# Patient Record
Sex: MALE | Race: WHITE | Employment: FULL TIME | URBAN - METROPOLITAN AREA
[De-identification: names, ages, dates, MRNs, and addresses within clinical notes are randomized per-mention and may not be internally consistent; named-entity substitution may affect disease eponyms.]

---

## 2021-01-07 ENCOUNTER — HOSPITAL ENCOUNTER (EMERGENCY)
Age: 21
Discharge: HOME | End: 2021-01-07

## 2021-01-07 ENCOUNTER — APPOINTMENT (OUTPATIENT)
Dept: GENERAL RADIOLOGY | Age: 21
End: 2021-01-07

## 2021-01-07 VITALS
SYSTOLIC BLOOD PRESSURE: 144 MMHG | HEIGHT: 72 IN | DIASTOLIC BLOOD PRESSURE: 69 MMHG | OXYGEN SATURATION: 97 % | TEMPERATURE: 98.2 F | HEART RATE: 64 BPM | RESPIRATION RATE: 19 BRPM | WEIGHT: 150 LBS | BODY MASS INDEX: 20.32 KG/M2

## 2021-01-07 DIAGNOSIS — M25.511 ACUTE PAIN OF RIGHT SHOULDER: Primary | ICD-10-CM

## 2021-01-07 PROCEDURE — 73030 X-RAY EXAM OF SHOULDER: CPT

## 2021-01-07 PROCEDURE — 99283 EMERGENCY DEPT VISIT LOW MDM: CPT

## 2021-01-07 ASSESSMENT — PAIN DESCRIPTION - ORIENTATION: ORIENTATION: RIGHT

## 2021-01-07 ASSESSMENT — PAIN SCALES - GENERAL: PAINLEVEL_OUTOF10: 7

## 2021-01-07 NOTE — ED NOTES
Patient present to the ED with Right shoulder injury due to lifting furnature     Ivon Duong  01/07/21 1845

## 2021-01-08 NOTE — ED PROVIDER NOTES
EMERGENCY DEPARTMENT ENCOUNTER      PCP: Jovanna Lopez MD    CHIEF COMPLAINT    Chief Complaint   Patient presents with    Shoulder Injury     right       This patient was not evaluated by the attending physician. I have independently evaluated this patient. My supervising physician was available for consultation. HPI    Rosa Garcia is a 23 y.o. male who presents with right shoulder pain. Context is this morning around 5:30 AM he was pulling heavy furniture and felt 2 pops in his right shoulder and since then has been having intermittent pain with certain motions of his right shoulder. The quality of the pain is sharp when it occurs. He denies pain at rest when he is not moving his arm. The pain does not radiate. The pain worsens with specific movements and palpation. Pt has tried nothing for relief of pain. Patient denies history of injury to right shoulder in the past.    Patient denies fever, chills, distal numbness, tingling, weakness, or functional deficit. Patient denies other injuries. Patient denies chest pain, shortness of breath. REVIEW OF SYSTEMS    General: Denies fever or chills  Cardiac: Denies chest pain  Pulmonary: Denies shortness of breath, wheezes, or difficulty breathing  GI: Denies abdominal pain, vomiting, or diarrhea  : No dysuria or hematuria  Musculoskeletal: See HPI; Denies any other musculoskeletal injuries, including chest wall and back. All other review of systems are negative  See HPI and nursing notes for additional information     [address]    History reviewed. No pertinent past medical history. History reviewed. No pertinent surgical history.     CURRENT MEDICATIONS        ALLERGIES    No Known Allergies    SOCIAL & FAMILY HISTORY    Social History     Socioeconomic History    Marital status: Single     Spouse name: None    Number of children: None    Years of education: None    Highest education level: None   Occupational History    None   Social Needs    Financial resource strain: None    Food insecurity     Worry: None     Inability: None    Transportation needs     Medical: None     Non-medical: None   Tobacco Use    Smoking status: Current Every Day Smoker   Substance and Sexual Activity    Alcohol use: Yes     Comment: occ    Drug use: Yes     Types: Marijuana    Sexual activity: None   Lifestyle    Physical activity     Days per week: None     Minutes per session: None    Stress: None   Relationships    Social connections     Talks on phone: None     Gets together: None     Attends Hindu service: None     Active member of club or organization: None     Attends meetings of clubs or organizations: None     Relationship status: None    Intimate partner violence     Fear of current or ex partner: None     Emotionally abused: None     Physically abused: None     Forced sexual activity: None   Other Topics Concern    None   Social History Narrative    None     History reviewed. No pertinent family history. PHYSICAL EXAM    VITAL SIGNS: BP (!) 144/69   Pulse 64   Temp 98.2 °F (36.8 °C) (Oral)   Resp 19   Ht 6' (1.829 m)   Wt 150 lb (68 kg)   SpO2 97%   BMI 20.34 kg/m²   Constitutional:  Well developed, Appears comfortable    Musculoskeletal:    Neck:  No gross swelling or discoloration on inspection. No tenderness to palpation or palpable defect. Full range of motion without pain. Right Shoulder Exam:   -Inspection:   No obvious defects, deformities, or discoloration. Skin intact   - Palpation: No swelling     No redness, or warmth     + tenderness to palpation of right AC joint region. No tenderness to palpation of scapula, trapezius, clavicle, humerus. -ROM:   Full active ROM   -Provocative tests: Positive crossarm, positive Brar, negative Swoope, negative drop Arm    No swelling, discoloration, tenderness to palpation, or range of motion deficit of the ipsilateral elbow.     Strength 5/5 and extremity is distally neurovascularly intact. Vascular: Radial pulse 2+ in right upper extremity  Integument:  Well hydrated, no rash   Neurologic:  Alert and oriented. Distal sensation intact. No functional deficits of elbows, wrists, hands, or fingers.  strength out of 5 in right upper extremity. Psychiatric: Cooperative, pleasant affect        RADIOLOGY/PROCEDURES    XR SHOULDER RIGHT (MIN 2 VIEWS)   Preliminary Result   No acute osseous abnormality of the right shoulder. ED COURSE & MEDICAL DECISION MAKING       Vital signs and nursing notes reviewed during ED course. I have independently evaluated this patient. Supervising physician present in the Emergency Department, available for consultation, throughout entirety of patient care. History and exam consistent with musculoskeletal pain of shoulder. Patient and I discussed the possibilty of internal derangement today. I recommend rest and ice. Recommend NSAIDs and patient declines prescription but rather will take NSAIDs over-the-counter. While in the ED, right shoulder x-ray obtained reveals no acute osseous abnormality of the right shoulder per radiologist report. Affected extremity is distally neurovascularly intact. I have low clinical suspicion for septic joint, nerve injury, vascular injury, ACS. Patient is comfortable with discharge at this time. Patient is nontoxic appearing. Vital signs stable. Patient is stable for outpatient management. All pertinent Lab data and radiographic results reviewed with patient at bedside. The patient and/or the family were informed of the results of any tests/labs/imaging, the treatment plan, and time was allotted to answer questions. Diagnosis, disposition, and plan discussed in detail with patient and/or the family today who understands and agrees.  Patient understands and agrees to follow up with PCP as needed for recheck, and with orthopedic physician in the next 5-7 days if no improvement. Patient understands and agrees to return to the emergency department for any new or worsening symptoms, including but not limited to numbness, weakness, tingling, fever, erythema, swelling, warmth of joints, redness of joints. Clinical  IMPRESSION    1. Acute pain of right shoulder           Disposition referral (if applicable):  MD [id number]  [phone]    Call   As needed    aGye Unger MD  [address]  [phone]    Call   Recheck in 5-7 days, As needed    [facility] Emergency Department  [phone]  Go to   Return to ED if symptoms worsen or new symptoms      Disposition medications (if applicable): There are no discharge medications for this patient. Comment: Please note this report has been produced using speech recognition software and may contain errors related to that system including errors in grammar, punctuation, and spelling, as well as words and phrases that may be inappropriate. If there are any questions or concerns please feel free to contact the dictating provider for clarification.           Hilbert Dakins, PA-C  01/07/21 4203

## 2022-04-07 ENCOUNTER — APPOINTMENT (OUTPATIENT)
Dept: GENERAL RADIOLOGY | Age: 22
End: 2022-04-07
Payer: COMMERCIAL

## 2022-04-07 ENCOUNTER — HOSPITAL ENCOUNTER (EMERGENCY)
Age: 22
Discharge: HOME | End: 2022-04-07
Attending: EMERGENCY MEDICINE
Payer: COMMERCIAL

## 2022-04-07 VITALS
RESPIRATION RATE: 12 BRPM | SYSTOLIC BLOOD PRESSURE: 133 MMHG | BODY MASS INDEX: 32.76 KG/M2 | TEMPERATURE: 97.3 F | OXYGEN SATURATION: 99 % | WEIGHT: 234 LBS | HEIGHT: 71 IN | HEART RATE: 69 BPM | DIASTOLIC BLOOD PRESSURE: 60 MMHG

## 2022-04-07 DIAGNOSIS — M75.21 BICEPS TENDINITIS OF RIGHT UPPER EXTREMITY: Primary | ICD-10-CM

## 2022-04-07 PROCEDURE — 73060 X-RAY EXAM OF HUMERUS: CPT

## 2022-04-07 PROCEDURE — 99283 EMERGENCY DEPT VISIT LOW MDM: CPT

## 2022-04-07 RX ORDER — TIZANIDINE 4 MG/1
4 TABLET ORAL EVERY 8 HOURS PRN
Qty: 9 TABLET | Refills: 0 | Status: SHIPPED | OUTPATIENT
Start: 2022-04-07 | End: 2022-04-10

## 2022-04-07 RX ORDER — NAPROXEN 500 MG/1
500 TABLET ORAL 2 TIMES DAILY PRN
Qty: 20 TABLET | Refills: 0 | Status: SHIPPED | OUTPATIENT
Start: 2022-04-07 | End: 2022-04-17

## 2022-04-07 ASSESSMENT — PAIN DESCRIPTION - FREQUENCY: FREQUENCY: CONTINUOUS

## 2022-04-07 ASSESSMENT — ENCOUNTER SYMPTOMS
RESPIRATORY NEGATIVE: 1
EYES NEGATIVE: 1
GASTROINTESTINAL NEGATIVE: 1
BACK PAIN: 0

## 2022-04-07 ASSESSMENT — PAIN - FUNCTIONAL ASSESSMENT: PAIN_FUNCTIONAL_ASSESSMENT: 0-10

## 2022-04-07 ASSESSMENT — PAIN DESCRIPTION - DESCRIPTORS: DESCRIPTORS: ACHING;THROBBING

## 2022-04-07 ASSESSMENT — PAIN DESCRIPTION - PAIN TYPE: TYPE: ACUTE PAIN

## 2022-04-07 ASSESSMENT — PAIN SCALES - GENERAL: PAINLEVEL_OUTOF10: 5

## 2022-04-07 ASSESSMENT — PAIN DESCRIPTION - ORIENTATION: ORIENTATION: RIGHT

## 2022-04-07 ASSESSMENT — PAIN DESCRIPTION - LOCATION: LOCATION: ARM

## 2022-04-07 NOTE — ED PROVIDER NOTES
The history is provided by the patient. Arm Injury  Location:  Arm  Arm location:  R arm  Pain details:     Quality:  Aching and dull    Severity:  Moderate    Onset quality:  Gradual    Timing:  Constant    Progression:  Worsening  Handedness:  Right-handed  Dislocation: no    Tetanus status:  Up to date  Prior injury to area:  No  Relieved by:  Rest  Worsened by: Movement, stretching area and exercise  Ineffective treatments:  None tried  Associated symptoms: decreased range of motion and stiffness    Associated symptoms: no back pain, no fatigue, no fever, no muscle weakness, no neck pain, no numbness, no swelling and no tingling    Associated symptoms comment:   for the past 3 days has been having pain in his right arm from elbow to shoulder.  lifts air tanks at work.  has taken nothing for the pain. Review of Systems   Constitutional: Negative. Negative for fatigue and fever. HENT: Negative. Eyes: Negative. Respiratory: Negative. Cardiovascular: Negative. Gastrointestinal: Negative. Genitourinary: Negative. Musculoskeletal: Positive for stiffness. Negative for back pain and neck pain. Skin: Negative. Neurological: Negative. All other systems reviewed and are negative. History reviewed. No pertinent family history.   Social History     Socioeconomic History    Marital status: Single     Spouse name: Not on file    Number of children: Not on file    Years of education: Not on file    Highest education level: Not on file   Occupational History    Not on file   Tobacco Use    Smoking status: Current Every Day Smoker     Packs/day: 0.20     Types: Cigarettes    Smokeless tobacco: Never Used   Vaping Use    Vaping Use: Every day    Substances: Nicotine   Substance and Sexual Activity    Alcohol use: Yes     Comment: occ    Drug use: Not Currently     Types: Marijuana Lum Olden)    Sexual activity: Not on file   Other Topics Concern    Not on file Social History Narrative    Not on file     Social Determinants of Health     Financial Resource Strain:     Difficulty of Paying Living Expenses: Not on file   Food Insecurity:     Worried About Running Out of Food in the Last Year: Not on file    [place] of Food in the Last Year: Not on file   Transportation Needs:     Lack of Transportation (Medical): Not on file    Lack of Transportation (Non-Medical): Not on file   Physical Activity:     Days of Exercise per Week: Not on file    Minutes of Exercise per Session: Not on file   Stress:     Feeling of Stress : Not on file   Social Connections:     Frequency of Communication with Friends and Family: Not on file    Frequency of Social Gatherings with Friends and Family: Not on file    Attends Roman Catholic Services: Not on file    Active Member of [address] [place] [address] [employer] or Organizations: Not on file    Attends Club or Organization Meetings: Not on file    Marital Status: Not on file   Intimate Partner Violence:     Fear of Current or Ex-Partner: Not on file    Emotionally Abused: Not on file    Physically Abused: Not on file    Sexually Abused: Not on file   Housing Stability:     Unable to Pay for Housing in the Last Year: Not on file    Number of [place] in the Last Year: Not on file    Unstable Housing in the Last Year: Not on file     History reviewed. No pertinent surgical history. History reviewed. No pertinent past medical history. No Known Allergies  Prior to Admission medications    Medication Sig Start Date End Date Taking?  Authorizing Provider   naproxen (NAPROSYN) 500 MG tablet Take 1 tablet by mouth 2 times daily as needed for Pain 4/7/22 4/17/22 Yes Ghislaine Still DO   tiZANidine (ZANAFLEX) 4 MG tablet Take 1 tablet by mouth every 8 hours as needed (spasm) 4/7/22 4/10/22 Yes Ghislaine Still DO       /60   Pulse 69   Temp 97.3 °F (36.3 °C) (Oral)   Resp 12   Ht 5' 11\" (1.803 m)   Wt 234 lb (106.1 kg)   SpO2 99%   BMI 32.64 kg/m²     Physical Exam  Vitals and nursing note reviewed. Constitutional:       Appearance: He is well-developed. HENT:      Head: Normocephalic and atraumatic. Right Ear: External ear normal.      Left Ear: External ear normal.      Nose: Nose normal.   Eyes:      Conjunctiva/sclera: Conjunctivae normal.      Pupils: Pupils are equal, round, and reactive to light. Cardiovascular:      Rate and Rhythm: Normal rate and regular rhythm. Heart sounds: Normal heart sounds. Pulmonary:      Effort: Pulmonary effort is normal.      Breath sounds: Normal breath sounds. Abdominal:      General: Bowel sounds are normal.      Palpations: Abdomen is soft. Musculoskeletal:         General: Normal range of motion. Right upper arm: Tenderness present. No swelling, edema or bony tenderness. Cervical back: Normal range of motion and neck supple. Comments: Biceps tendon is painful. AROM and PROM normal   Skin:     General: Skin is warm and dry. Neurological:      Mental Status: He is alert and oriented to person, place, and time. GCS: GCS eye subscore is 4. GCS verbal subscore is 5. GCS motor subscore is 6. Psychiatric:         Behavior: Behavior normal.         Thought Content: Thought content normal.         Judgment: Judgment normal.         MDM:    Labs Reviewed - No data to display    XR HUMERUS RIGHT (MIN 2 VIEWS)   Final Result   No convincing acute osseous abnormality. NSAIDS and Zanaflex   My typical dicussion, presentation, and considerations for this patients' chief complaint, diagnosis, differential diagnosis, medications, medication use,  medication safety and medication interactions have been explained and outlined to this patient for this patient encounter. I have stressed need for follow up and reexamination for this encounter. Final Impression    1.  Biceps tendinitis of right upper extremity             María Perez DO  04/07/22 1936

## 2022-04-08 NOTE — ED NOTES
Pt discharged with instructions and prescriptions. Discussed when and how to take medications and pt stated understanding.   Pt walked out of the [place] [id number], RN  04/07/22 2001

## 2023-12-12 ENCOUNTER — HOSPITAL ENCOUNTER (EMERGENCY)
Age: 23
Discharge: HOME | End: 2023-12-12
Attending: EMERGENCY MEDICINE

## 2023-12-12 VITALS
RESPIRATION RATE: 18 BRPM | SYSTOLIC BLOOD PRESSURE: 150 MMHG | HEART RATE: 79 BPM | HEIGHT: 71 IN | BODY MASS INDEX: 32.64 KG/M2 | TEMPERATURE: 98.2 F | DIASTOLIC BLOOD PRESSURE: 82 MMHG | OXYGEN SATURATION: 99 %

## 2023-12-12 DIAGNOSIS — J06.9 VIRAL UPPER RESPIRATORY TRACT INFECTION: Primary | ICD-10-CM

## 2023-12-12 PROCEDURE — 99283 EMERGENCY DEPT VISIT LOW MDM: CPT

## 2023-12-12 RX ORDER — DEXTROMETHORPHAN HYDROBROMIDE, GUAIFENESIN AND PSEUDOEPHEDRINE HYDROCHLORIDE 15; 400; 60 MG/1; MG/1; MG/1
1 TABLET ORAL 4 TIMES DAILY
Qty: 28 TABLET | Refills: 0 | Status: SHIPPED | OUTPATIENT
Start: 2023-12-12

## 2023-12-12 ASSESSMENT — PAIN - FUNCTIONAL ASSESSMENT
PAIN_FUNCTIONAL_ASSESSMENT: 0-10
PAIN_FUNCTIONAL_ASSESSMENT: NONE - DENIES PAIN

## 2023-12-12 ASSESSMENT — ENCOUNTER SYMPTOMS
SHORTNESS OF BREATH: 1
COUGH: 1
SORE THROAT: 1

## 2023-12-12 ASSESSMENT — PAIN SCALES - GENERAL: PAINLEVEL_OUTOF10: 3

## 2023-12-12 NOTE — ED PROVIDER NOTES
Triage Chief Complaint:   Pharyngitis, Nasal Congestion, and Generalized Body Aches    [place]:  Ashley Hatch is a 24 y.o. male that presents to the ED complaining nasal congestion cough sore throat muscle aches. Symptoms been present for about 48 hours he has not been around anybody with known symptoms but he states his father recently had surgery and may have had a cold that passed on he can tolerate secretions. He is having no sputum production or hemoptysis. No known exposure to anybody with directly with influenza or COVID. History reviewed. No pertinent past medical history. History reviewed. No pertinent surgical history. History reviewed. No pertinent family history. Social History     Socioeconomic History    Marital status: Single     Spouse name: Not on file    Number of children: Not on file    Years of education: Not on file    Highest education level: Not on file   Occupational History    Not on file   Tobacco Use    Smoking status: Every Day     Packs/day: .2     Types: Cigarettes    Smokeless tobacco: Never   Vaping Use    Vaping Use: Every day    Substances: Nicotine   Substance and Sexual Activity    Alcohol use: Yes     Comment: occ    Drug use: Not Currently     Types: Marijuana Agnes Risen)    Sexual activity: Not on file   Other Topics Concern    Not on file   Social History Narrative    Not on file     Social Determinants of Health     Financial Resource Strain: Not on file   Food Insecurity: Not on file   Transportation Needs: Not on file   Physical Activity: Not on file   Stress: Not on file   Social Connections: Not on file   Intimate Partner Violence: Not on file   Housing Stability: Not on file     No current facility-administered medications for this encounter.      Current Outpatient Medications   Medication Sig Dispense Refill    Pseudoephedrine-DM-GG (CAPMIST DM) 60- MG TABS Take 1 tablet by mouth 4 times daily 28 tablet 0    naproxen (NAPROSYN) 500 MG tablet Take 1 tablet

## 2025-04-21 ENCOUNTER — HOSPITAL ENCOUNTER (EMERGENCY)
Age: 25
Discharge: HOME | End: 2025-04-21
Attending: EMERGENCY MEDICINE

## 2025-04-21 VITALS
HEART RATE: 65 BPM | HEIGHT: 71 IN | OXYGEN SATURATION: 96 % | DIASTOLIC BLOOD PRESSURE: 78 MMHG | RESPIRATION RATE: 16 BRPM | BODY MASS INDEX: 33.6 KG/M2 | SYSTOLIC BLOOD PRESSURE: 133 MMHG | WEIGHT: 240 LBS | TEMPERATURE: 98.1 F

## 2025-04-21 DIAGNOSIS — R11.0 NAUSEA: Primary | ICD-10-CM

## 2025-04-21 PROCEDURE — 99282 EMERGENCY DEPT VISIT SF MDM: CPT

## 2025-04-21 ASSESSMENT — PAIN - FUNCTIONAL ASSESSMENT: PAIN_FUNCTIONAL_ASSESSMENT: NONE - DENIES PAIN

## 2025-04-21 ASSESSMENT — ENCOUNTER SYMPTOMS
ABDOMINAL PAIN: 1
NAUSEA: 1
DIARRHEA: 1

## 2025-04-21 ASSESSMENT — LIFESTYLE VARIABLES
HOW MANY STANDARD DRINKS CONTAINING ALCOHOL DO YOU HAVE ON A TYPICAL DAY: 3 OR 4
HOW OFTEN DO YOU HAVE A DRINK CONTAINING ALCOHOL: 2-4 TIMES A MONTH

## 2025-04-21 NOTE — ED TRIAGE NOTES
Patient complaining of nasal congestion and gassy stomach. Started Tuesday last week, says he feels like he is getting better but took 3 days off work and needs note to go back to work now.    (M6) obeys commands

## 2025-04-21 NOTE — ED PROVIDER NOTES
Triage Chief Complaint:   Congestion and GI Problem (gassy)    [place]:  Rogelio Fletcher is a 24 y.o. male that presents to the ED at the request that he needs a note explaining going to work the past 3 days.    Patient is currently asymptomatic told the nurse he is having some \"bubbles\" in his stomach felt congested.    Patient is a daily smoker Daily marijuana user.  Denies any history of pancreatitis no fevers chills missed his stomach being saw her states he feels okay now does not want anything for nausea vomiting diarrhea no pain        History reviewed. No pertinent past medical history.  History reviewed. No pertinent surgical history.  History reviewed. No pertinent family history.  Social History     Socioeconomic History    Marital status: Single     Spouse name: Not on file    Number of children: Not on file    Years of education: Not on file    Highest education level: Not on file   Occupational History    Not on file   Tobacco Use    Smoking status: Every Day     Current packs/day: 0.20     Types: Cigarettes    Smokeless tobacco: Never   Vaping Use    Vaping status: Every Day    Substances: Nicotine   Substance and Sexual Activity    Alcohol use: Yes     Comment: occ    Drug use: Not Currently     Types: Marijuana (Weed)    Sexual activity: Not on file   Other Topics Concern    Not on file   Social History Narrative    Not on file     Social Drivers of Health     Financial Resource Strain: Not on file   Food Insecurity: Not on file   Transportation Needs: Not on file   Physical Activity: Not on file   Stress: Not on file   Social Connections: Not on file   Intimate Partner Violence: Not on file   Housing Stability: Not on file     No current facility-administered medications for this encounter.     Current Outpatient Medications   Medication Sig Dispense Refill    Pseudoephedrine-DM-GG (CAPMIST DM) 60- MG TABS Take 1 tablet by mouth 4 times daily (Patient not taking: Reported on 4/21/2025) 28 tablet 0